# Patient Record
Sex: FEMALE | Race: WHITE | NOT HISPANIC OR LATINO | URBAN - NONMETROPOLITAN AREA
[De-identification: names, ages, dates, MRNs, and addresses within clinical notes are randomized per-mention and may not be internally consistent; named-entity substitution may affect disease eponyms.]

---

## 2020-10-15 ENCOUNTER — IMPORTED ENCOUNTER (OUTPATIENT)
Dept: URBAN - NONMETROPOLITAN AREA CLINIC 1 | Facility: CLINIC | Age: 75
End: 2020-10-15

## 2020-10-15 PROBLEM — H52.4: Noted: 2020-10-15

## 2020-10-15 PROBLEM — H35.033: Noted: 2020-10-15

## 2020-10-15 PROBLEM — H52.223: Noted: 2020-10-15

## 2020-10-15 PROBLEM — H52.03: Noted: 2020-10-15

## 2020-10-15 PROBLEM — H25.813: Noted: 2020-10-15

## 2020-10-15 PROBLEM — H35.371: Noted: 2020-10-15

## 2020-10-15 PROBLEM — H02.413: Noted: 2020-10-15

## 2020-10-15 PROCEDURE — 92015 DETERMINE REFRACTIVE STATE: CPT

## 2020-10-15 PROCEDURE — 99204 OFFICE O/P NEW MOD 45 MIN: CPT

## 2020-10-15 NOTE — PATIENT DISCUSSION
Cataract OUReviewed symptoms of advancing cataract growth such as glare and halos and decreased vision. Continue to monitor for now. Pt will notify us if any new symptoms develop. Monitor for glaucoma ERM Discussed in detail w/pt continue to monitorASVD +3 discussed in detail recommend pt monitor BP and cholesterol Hyperopia/astigmatism/presbyopia Discussed diagnosis with patient. Updated spec Rx given.   Recommend lens that will provide comfort as well as protect safety and health of eyes.; Dr's Notes: Dr. Amarilis Banda

## 2022-04-09 ASSESSMENT — TONOMETRY
OS_IOP_MMHG: 14
OD_IOP_MMHG: 15

## 2022-04-09 ASSESSMENT — VISUAL ACUITY
OD_SC: 20/20
OS_SC: 20/20